# Patient Record
Sex: FEMALE | Race: BLACK OR AFRICAN AMERICAN | NOT HISPANIC OR LATINO | URBAN - METROPOLITAN AREA
[De-identification: names, ages, dates, MRNs, and addresses within clinical notes are randomized per-mention and may not be internally consistent; named-entity substitution may affect disease eponyms.]

---

## 2017-05-16 ENCOUNTER — OFFICE VISIT - HEALTHEAST (OUTPATIENT)
Dept: FAMILY MEDICINE | Facility: CLINIC | Age: 27
End: 2017-05-16

## 2017-05-16 DIAGNOSIS — Z23 NEED FOR VACCINATION: ICD-10-CM

## 2017-05-16 DIAGNOSIS — Z71.84 TRAVEL ADVICE ENCOUNTER: ICD-10-CM

## 2017-05-16 RX ORDER — ATOVAQUONE AND PROGUANIL HYDROCHLORIDE 250; 100 MG/1; MG/1
1 TABLET, FILM COATED ORAL DAILY
Qty: 84 TABLET | Refills: 0 | Status: SHIPPED | OUTPATIENT
Start: 2017-05-16

## 2017-05-16 ASSESSMENT — MIFFLIN-ST. JEOR: SCORE: 1502.4

## 2017-05-24 ENCOUNTER — COMMUNICATION - HEALTHEAST (OUTPATIENT)
Dept: FAMILY MEDICINE | Facility: CLINIC | Age: 27
End: 2017-05-24

## 2021-05-30 VITALS — HEIGHT: 62 IN | WEIGHT: 185 LBS | BODY MASS INDEX: 34.04 KG/M2

## 2021-06-10 NOTE — PROGRESS NOTES
Subjective: This patient comes in for evaluation she is here for the first time.  She is a 26-year-old female.  Patient past history unremarkable denies any significant medical issues no breathing problems diabetes allergies.  She is not on any medication    Denies any surgeries.    She will be traveling to [facility] [place] visiting family.    We will be gone 75 days.    I reviewed [facility] [place] recommendations regarding her travel.  She will need malaria tablets I went through options and elected use Malarone starting 2 days prior 75 days there and 7 days after.    Also will go on typhoid medication taking 1 tablet every other day.  She will start this today or tomorrow.    Also discussed her immunizations and she should get a hepatitis A shot she was a little reluctant at first I reviewed the recommendations and showed her in writing the recommendation regarding that and she did agree.  She understands she will need another hepatitis A shot in 6 months.    Patient has otherwise been healthy denies additional issues or problems    We talked about mosquito avoidance also proper foods, adventurous eating etc.    Also can use some Pepto-Bismol as needed chewable 2 tablets 4 times daily.  For diarrhea.    Tobacco status: She  reports that she has never smoked. She does not have any smokeless tobacco history on file.    There are no active problems to display for this patient.      Current Outpatient Prescriptions   Medication Sig Dispense Refill     atovaquone-proguanil (MALARONE) 250-100 mg Tab Take 1 tablet by mouth once daily. Start 2 days prior to departure.  Continue for 7  Days after return. 84 tablet 0     typhoid (VIVOTIF) SR capsule 1 po qod starting today 4 capsule 0     No current facility-administered medications for this visit.        ROS:   10 point review of systems negative other than as outlined above    Objective:    /60 (Patient Site: Left Arm, Patient Position: Sitting, Cuff Size: Adult Large)  " Pulse 92  Temp 97  F (36.1  C) (Oral)   Resp 20  Ht 5' 2\" (1.575 m)  Wt 185 lb (83.9 kg)  LMP Comment: LMP 7 months ago  SpO2 97% Comment: at rest with room air  BMI 33.84 kg/m2  Body mass index is 33.84 kg/(m^2).      General appearance no acute distress    Vital signs are stable there is no fever    She denies any abdominal pain no additional exam done.    No allergies    Review of immunizations in the record.    No results found for this or any previous visit.    Assessment:  1. Travel advice encounter  atovaquone-proguanil (MALARONE) 250-100 mg Tab    typhoid (VIVOTIF) SR capsule   2. Need for vaccination  Hepatitis A vaccine adult IM     Travel advice encounter.  Please see above discussion regarding Malarone typhoid hepatitis A    Plan: Total time with the patient 30 minutes entire time spent in counseling    This transcription uses voice recognition software, which may contain typographical errors.    "